# Patient Record
Sex: FEMALE | Race: BLACK OR AFRICAN AMERICAN | NOT HISPANIC OR LATINO | Employment: OTHER | URBAN - METROPOLITAN AREA
[De-identification: names, ages, dates, MRNs, and addresses within clinical notes are randomized per-mention and may not be internally consistent; named-entity substitution may affect disease eponyms.]

---

## 2024-02-12 ENCOUNTER — OFFICE VISIT (OUTPATIENT)
Dept: URGENT CARE | Facility: CLINIC | Age: 74
End: 2024-02-12
Payer: MEDICARE

## 2024-02-12 VITALS
TEMPERATURE: 99 F | HEIGHT: 61 IN | OXYGEN SATURATION: 99 % | DIASTOLIC BLOOD PRESSURE: 83 MMHG | RESPIRATION RATE: 16 BRPM | HEART RATE: 60 BPM | WEIGHT: 155 LBS | SYSTOLIC BLOOD PRESSURE: 143 MMHG | BODY MASS INDEX: 29.27 KG/M2

## 2024-02-12 DIAGNOSIS — R33.9 URINARY RETENTION: Primary | ICD-10-CM

## 2024-02-12 PROCEDURE — 99202 OFFICE O/P NEW SF 15 MIN: CPT | Mod: ,,, | Performed by: FAMILY MEDICINE

## 2024-02-12 RX ORDER — FINASTERIDE 5 MG/1
5 TABLET, FILM COATED ORAL
COMMUNITY

## 2024-02-12 RX ORDER — PRAVASTATIN SODIUM 40 MG/1
1 TABLET ORAL EVERY MORNING
COMMUNITY

## 2024-02-12 RX ORDER — DIGOXIN 250 MCG
1 TABLET ORAL EVERY MORNING
COMMUNITY

## 2024-02-12 RX ORDER — DICLOFENAC POTASSIUM 50 MG/1
50 TABLET, FILM COATED ORAL 2 TIMES DAILY
COMMUNITY

## 2024-02-12 RX ORDER — DUPILUMAB 300 MG/2ML
INJECTION, SOLUTION SUBCUTANEOUS
COMMUNITY

## 2024-02-12 RX ORDER — HYDROCHLOROTHIAZIDE 25 MG/1
1 TABLET ORAL EVERY MORNING
COMMUNITY

## 2024-02-12 NOTE — PROGRESS NOTES
"Subjective:      Patient ID: Rand Dyer is a 73 y.o. female.    Vitals:  height is 5' 1" (1.549 m) and weight is 70.3 kg (155 lb). Her temperature is 98.6 °F (37 °C). Her blood pressure is 143/83 (abnormal) and her pulse is 60. Her respiration is 16 and oxygen saturation is 99%.     Chief Complaint: Urinary Retention (Saturday night could not urinate even though bladder felt full. Called her doctor and given advice. Retention has been on and off since then. Denies pain. History of prolapse bladder. )    HPI:  73-year-old female known for multiple chronic medical condition follows up with primary MD Dr. Lobato.  For bladder issues follows up with primary OBGYN   Present to clinic with concerns of urinary retention since last 48 hours.  States primary gyn on vacation, has been in contact with MD on call.  Patient has tried adequate hydration, with history of bladder prolapse was encouraged to push the bladder up and back.  With following methods patient has been able to urinate from Saturday morning until today morning.  Appears concerned with ongoing symptoms.  Denies burning with urination, no odor, no concerns of bladder infection.  Could not urinate in the clinic today, states did not hydrate herself since morning as she will be on the road.  No fever, no flank pain.    ROS :  Constitutional : _No fever, no fatigue or weakness  Neck : _Negative except HPI  Respiratory :_ No coughing, no shortness of breath  Cardiovascular : _No chest pain, no palpitations  Gastrointestinal : _No nausea, no vomiting  Genitourinary : _No flank pain  Integumentary : _Negative for skin rash   Objective:     Physical Exam  General : Alert and Oriented, No apparent distress, afebrile, appears comfortable sitting in the exam chair, clear speech and appropriate communication.  Talking and smiling  Neck - supple  Respiratory : Bilateral equal breath sounds, nonlabored respirations, clear to auscultate   Cardiovascular : " Rate rhythm regular, normal volume pulse  Gastrointestinal : Soft, mild suprapubic pressure on palpation ,, no palpable bladder, BS X 4 quadrants - normal  Genitourinary : No CVA tenderness Bilateral  Integumentary : Warm, Dry   Assessment:     1. Urinary retention      Plan:   Discussed in detail on physical findings, encouraged conservative methods as well.  Continue hydration for now.  Initially patient was requesting for ultrasound, defers before leaving.  We will observe the symptoms for now.  Encouraged hydration.    Encouraged continuing the methods as directed by her primary OBGYN  ER precautions with any acute change in symptoms.  Patient voiced understanding, agrees with the plan of care  Deferred further urine studies as this appears more mechanical than infection    Urinary retention  -     Cancel: POCT Urinalysis, Dipstick, Automated, W/O Scope

## 2024-02-12 NOTE — PATIENT INSTRUCTIONS
Discussed in detail on physical findings, encouraged conservative methods as well.  Continue hydration for now.  Initially patient was requesting for ultrasound, defers before leaving.  We will observe the symptoms for now.  Encouraged hydration.    Encouraged continuing the methods as directed by her primary OBGYN  ER precautions with any acute change in symptoms.  Patient voiced understanding, agrees with the plan of care  Deferred further urine studies as this appears more mechanical than infection

## 2025-04-25 DIAGNOSIS — E04.0 GOITER DIFFUSE, ADENOMATOUS: Primary | ICD-10-CM

## 2025-05-05 ENCOUNTER — CLINICAL SUPPORT (OUTPATIENT)
Dept: REHABILITATION | Facility: HOSPITAL | Age: 75
End: 2025-05-05
Attending: INTERNAL MEDICINE
Payer: MEDICARE

## 2025-05-05 ENCOUNTER — HOSPITAL ENCOUNTER (OUTPATIENT)
Dept: RADIOLOGY | Facility: HOSPITAL | Age: 75
Discharge: HOME | End: 2025-05-05
Attending: INTERNAL MEDICINE
Payer: MEDICARE

## 2025-05-05 DIAGNOSIS — E04.0 GOITER DIFFUSE, ADENOMATOUS: ICD-10-CM

## 2025-05-05 PROCEDURE — 74230 X-RAY XM SWLNG FUNCJ C+: CPT | Mod: TC

## 2025-05-05 PROCEDURE — 92611 MOTION FLUOROSCOPY/SWALLOW: CPT

## 2025-05-05 NOTE — PROGRESS NOTES
[facility]  Speech Language Pathology Department  Outpatient Modified Barium Swallow Study    Patient Name:  Rand Dyer   MRN:  [id number]    Recommendations     General recommendations:  ENT consult for dysphonia  Repeat MBS study: N/A  Diet texture/consistency recommendations: Regular solids (IDDSI 7) and thin liquids (IDDSI 0)  Medications: per patient preference  Swallow strategies/precautions: small bites/sips and slow rate  General precautions: N/A    History/Reason for Referral     Rand Dyer is a/n 74 y.o. female referred by Dr. Kwadwo Puente for a Modified Barium Swallow Study due to newly identified thyroid nodules.    Past medical history includes aortic stenosis, osteoarthritis, rheumatoid arthritis, fatigue, GI bleed, hyperlipidemia, hypertension, and mitral valve prolapse.    Pt denies a history of CVA, c-spine/neck surgery, head/neck cancer, and GERD.    Home diet texture/consistency: Regular and thin liquids  Current Method of Nutrition: PO intake    Patient complaint: denies difficulty swallowing and coughing with meals, but does reports changes in her vocal quality    Subjective     Patient awake, alert, and cooperative.  Spiritual/Cultural/Holiness Beliefs/Practices that affect care: none    Pain/Comfort: no c/o pain    Respiratory Status:  room air    Restraints/positioning devices: none    Radiologist: Jovanni Cahwla MD    Fluoroscopic Findings     Oral Musculature  Dentition: own teeth  Secretion Management: adequate  Mucosal Quality: good  Facial Movement: WFL  Buccal Strength & Mobility: WFL  Mandibular Strength & Mobility: WFL  Oral Labial Strength & Mobility: WFL  Lingual Strength & Mobility: WFL  Velar Elevation: WFL  Vocal Quality: adequate  Volitional Cough: Nonproductive    Setup  Seated in straight chair  Able to self feed  Adequate head control    Visualization  Lateral view    Oral Phase:   Adequate lip closure  Adequate bolus  formation  Adequate mastication  Adequate bolus cohesion  Adequate anterior-posterior transport    Pharyngeal Phase:   Timely swallow reflex  Adequate base of tongue retraction  Adequate epiglottic deflection  Adequate hyolaryngeal excursion  Adequate airway protection  Consistency Laryngeal Penetration Aspiration Residue   Thin liquid by cup None None Trace   Puree None None Trace   Chewable solid None None None   Thin liquid by straw None None Trace     Cervical Esophageal Phase:   UES appeared to accommodate all bolus types without stasis or retrograde movement visualized    Assessment     Oropharyngeal swallow WFL with no laryngeal penetration or aspiration visualized during this study.     Patient appears to be at low risk for aspiration related pneumonia when considering functional oropharyngeal swallow, stable medical status, and adequate airway closure.      Patient Education     Patient provided with verbal education regarding results/recommendations.  Understanding was verbalized.    Time Tracking     SLP Treatment Date:  5/5/2025  Speech Start Time:  1255  Speech Stop Time:  1310     Speech Total Time (min):  15 minutes    Billable minutes:   Motion Fluoroscopic Evaluation, Video Recording, 15 minutes     05/05/2025